# Patient Record
Sex: FEMALE | Race: WHITE | NOT HISPANIC OR LATINO | Employment: PART TIME | URBAN - METROPOLITAN AREA
[De-identification: names, ages, dates, MRNs, and addresses within clinical notes are randomized per-mention and may not be internally consistent; named-entity substitution may affect disease eponyms.]

---

## 2017-05-10 ENCOUNTER — NON-PROVIDER VISIT (OUTPATIENT)
Dept: OCCUPATIONAL MEDICINE | Facility: CLINIC | Age: 37
End: 2017-05-10

## 2017-05-10 DIAGNOSIS — Z11.1 ENCOUNTER FOR PPD TEST: ICD-10-CM

## 2017-05-10 PROCEDURE — 86580 TB INTRADERMAL TEST: CPT | Performed by: PREVENTIVE MEDICINE

## 2017-05-10 NOTE — MR AVS SNAPSHOT
Flor Álvarez   5/10/2017 10:30 AM   Non-Provider Visit   MRN: [id number]    Department:  [facility]   Dept Phone:  [phone]    Description:  Female : 1980   Provider:  BELLA MCGOVERN MA           Reason for Visit     PPD Placement           Allergies as of 5/10/2017     No Known Allergies      You were diagnosed with     Encounter for PPD test   [[id number]]         Vital Signs     Smoking Status                   Former Smoker           Basic Information     Date Of Birth Sex Race Ethnicity Preferred Language    1980 Female White Non- English      Problem List              ICD-10-CM Priority Class Noted - Resolved    Hyperthyroidism E05.90   2015 - Present      Health Maintenance        Date Due Completion Dates    IMM DTaP/Tdap/Td Vaccine (1 - Tdap) 2001 ---    PAP SMEAR 2003 ---            Current Immunizations     Tuberculin Skin Test 5/10/2017 10:43 AM, 2015 12:50 PM      Below and/or attached are the medications your provider expects you to take. Review all of your home medications and newly ordered medications with your provider and/or pharmacist. Follow medication instructions as directed by your provider and/or pharmacist. Please keep your medication list with you and share with your provider. Update the information when medications are discontinued, doses are changed, or new medications (including over-the-counter products) are added; and carry medication information at all times in the event of emergency situations     Allergies:  No Known Allergies          Medications  Valid as of: May 10, 2017 - 11:40 AM    Generic Name Brand Name Tablet Size Instructions for use    Amphetamine-Dextroamphetamine (Tab) ADDERALL 20 MG Take 20 mg by mouth 2 times a day.        MethIMAzole (Tab) TAPAZOLE 5 MG Take 1 Tab by mouth every 48 hours.        .                 Medicines prescribed today were sent to:     [id number] - [place], [place] - [address]    [address] [facility] [place] [address]    Phone: [phone] Fax: [phone]    Open 24 Hours?: Yes      Medication refill instructions:       If your prescription bottle indicates you have medication refills left, it is not necessary to call your provider’s office. Please contact your pharmacy and they will refill your medication.    If your prescription bottle indicates you do not have any refills left, you may request refills at any time through one of the following ways: The online [employer] system (except Urgent Care), by calling your provider’s office, or by asking your pharmacy to contact your provider’s office with a refill request. Medication refills are processed only during regular business hours and may not be available until the next business day. Your provider may request additional information or to have a follow-up visit with you prior to refilling your medication.   *Please Note: Medication refills are assigned a new Rx number when refilled electronically. Your pharmacy may indicate that no refills were authorized even though a new prescription for the same medication is available at the pharmacy. Please request the medicine by name with the pharmacy before contacting your provider for a refill.           [employer] Access Code: Activation code not generated  Current [employer] Status: Active

## 2017-05-12 ENCOUNTER — NON-PROVIDER VISIT (OUTPATIENT)
Dept: OCCUPATIONAL MEDICINE | Facility: CLINIC | Age: 37
End: 2017-05-12

## 2017-05-12 LAB — TB WHEAL 3D P 5 TU DIAM: NORMAL MM

## 2022-09-08 ENCOUNTER — APPOINTMENT (RX ONLY)
Dept: URBAN - METROPOLITAN AREA CLINIC 20 | Facility: CLINIC | Age: 42
Setting detail: DERMATOLOGY
End: 2022-09-08

## 2022-09-08 DIAGNOSIS — L81.5 LEUKODERMA, NOT ELSEWHERE CLASSIFIED: ICD-10-CM

## 2022-09-08 DIAGNOSIS — L57.8 OTHER SKIN CHANGES DUE TO CHRONIC EXPOSURE TO NONIONIZING RADIATION: ICD-10-CM

## 2022-09-08 DIAGNOSIS — D22 MELANOCYTIC NEVI: ICD-10-CM

## 2022-09-08 DIAGNOSIS — D18.0 HEMANGIOMA: ICD-10-CM

## 2022-09-08 DIAGNOSIS — L82.1 OTHER SEBORRHEIC KERATOSIS: ICD-10-CM

## 2022-09-08 DIAGNOSIS — L81.4 OTHER MELANIN HYPERPIGMENTATION: ICD-10-CM

## 2022-09-08 PROBLEM — D18.01 HEMANGIOMA OF SKIN AND SUBCUTANEOUS TISSUE: Status: ACTIVE | Noted: 2022-09-08

## 2022-09-08 PROBLEM — D22.5 MELANOCYTIC NEVI OF TRUNK: Status: ACTIVE | Noted: 2022-09-08

## 2022-09-08 PROBLEM — D22.4 MELANOCYTIC NEVI OF SCALP AND NECK: Status: ACTIVE | Noted: 2022-09-08

## 2022-09-08 PROBLEM — D22.61 MELANOCYTIC NEVI OF RIGHT UPPER LIMB, INCLUDING SHOULDER: Status: ACTIVE | Noted: 2022-09-08

## 2022-09-08 PROCEDURE — ? COUNSELING

## 2022-09-08 PROCEDURE — 99203 OFFICE O/P NEW LOW 30 MIN: CPT

## 2022-09-08 ASSESSMENT — LOCATION ZONE DERM
LOCATION ZONE: FACE
LOCATION ZONE: LEG
LOCATION ZONE: ARM
LOCATION ZONE: NECK
LOCATION ZONE: TRUNK
LOCATION ZONE: AXILLAE

## 2022-09-08 ASSESSMENT — LOCATION DETAILED DESCRIPTION DERM
LOCATION DETAILED: RIGHT INFERIOR MEDIAL UPPER BACK
LOCATION DETAILED: LEFT MEDIAL UPPER BACK
LOCATION DETAILED: RIGHT MID-UPPER BACK
LOCATION DETAILED: LEFT INFERIOR LATERAL NECK
LOCATION DETAILED: RIGHT CENTRAL MALAR CHEEK
LOCATION DETAILED: LEFT INFERIOR CENTRAL MALAR CHEEK
LOCATION DETAILED: RIGHT AXILLARY VAULT
LOCATION DETAILED: LEFT DISTAL DORSAL FOREARM
LOCATION DETAILED: LEFT ANTERIOR PROXIMAL UPPER ARM
LOCATION DETAILED: LEFT ANTERIOR DISTAL THIGH
LOCATION DETAILED: RIGHT DISTAL DORSAL FOREARM
LOCATION DETAILED: RIGHT SUPERIOR MEDIAL UPPER BACK
LOCATION DETAILED: RIGHT ANTERIOR PROXIMAL UPPER ARM
LOCATION DETAILED: RIGHT ANTERIOR DISTAL THIGH
LOCATION DETAILED: LOWER STERNUM
LOCATION DETAILED: LEFT MEDIAL SUPERIOR CHEST

## 2022-09-08 ASSESSMENT — LOCATION SIMPLE DESCRIPTION DERM
LOCATION SIMPLE: LEFT UPPER BACK
LOCATION SIMPLE: RIGHT THIGH
LOCATION SIMPLE: CHEST
LOCATION SIMPLE: RIGHT UPPER ARM
LOCATION SIMPLE: RIGHT AXILLARY VAULT
LOCATION SIMPLE: LEFT CHEEK
LOCATION SIMPLE: RIGHT UPPER BACK
LOCATION SIMPLE: LEFT FOREARM
LOCATION SIMPLE: RIGHT FOREARM
LOCATION SIMPLE: LEFT UPPER ARM
LOCATION SIMPLE: RIGHT CHEEK
LOCATION SIMPLE: LEFT ANTERIOR NECK
LOCATION SIMPLE: LEFT THIGH

## 2023-04-25 ENCOUNTER — NON-PROVIDER VISIT (OUTPATIENT)
Dept: URGENT CARE | Facility: CLINIC | Age: 43
End: 2023-04-25

## 2023-04-25 DIAGNOSIS — Z11.1 PPD SCREENING TEST: ICD-10-CM

## 2023-04-25 PROCEDURE — 86580 TB INTRADERMAL TEST: CPT

## 2023-04-25 PROCEDURE — 99999 PR NO CHARGE: CPT

## 2023-04-25 NOTE — NON-PROVIDER
Flor Álvarez is a 42 y.o. female here for a non-provider visit for PPD placement -- Step 1 of 1    Reason for PPD:  work requirement    1. TB evaluation questionnaire completed by patient? Yes      -  If any answers marked yes did you contact a provider prior to placing? Not Indicated  2.  Patient notified to return to clinic for reading on: Thursday, 4/27/23 after 1627 or Friday, 4/28/23 before 1627.  3.  PPD Placement documentation completed on TB evaluation questionnaire? Yes  4.  Location of TB evaluation questionnaire filed: .

## 2023-04-28 ENCOUNTER — NON-PROVIDER VISIT (OUTPATIENT)
Dept: URGENT CARE | Facility: CLINIC | Age: 43
End: 2023-04-28

## 2023-04-28 LAB — TB WHEAL 3D P 5 TU DIAM: NORMAL MM

## 2023-04-28 PROCEDURE — 99999 PR NO CHARGE: CPT | Performed by: NURSE PRACTITIONER

## 2024-02-07 ENCOUNTER — HOSPITAL ENCOUNTER (EMERGENCY)
Facility: MEDICAL CENTER | Age: 44
End: 2024-02-08
Attending: EMERGENCY MEDICINE
Payer: COMMERCIAL

## 2024-02-07 DIAGNOSIS — R19.7 DIARRHEA, UNSPECIFIED TYPE: ICD-10-CM

## 2024-02-07 DIAGNOSIS — R11.2 NAUSEA AND VOMITING, UNSPECIFIED VOMITING TYPE: ICD-10-CM

## 2024-02-07 DIAGNOSIS — E87.6 HYPOKALEMIA: Primary | ICD-10-CM

## 2024-02-07 DIAGNOSIS — E86.0 DEHYDRATION: ICD-10-CM

## 2024-02-07 PROCEDURE — 83690 ASSAY OF LIPASE: CPT

## 2024-02-07 PROCEDURE — 36415 COLL VENOUS BLD VENIPUNCTURE: CPT

## 2024-02-07 PROCEDURE — 99284 EMERGENCY DEPT VISIT MOD MDM: CPT

## 2024-02-07 PROCEDURE — 85025 COMPLETE CBC W/AUTO DIFF WBC: CPT

## 2024-02-07 PROCEDURE — 80053 COMPREHEN METABOLIC PANEL: CPT

## 2024-02-07 RX ORDER — SODIUM CHLORIDE 9 MG/ML
1000 INJECTION, SOLUTION INTRAVENOUS ONCE
Status: COMPLETED | OUTPATIENT
Start: 2024-02-08 | End: 2024-02-08

## 2024-02-07 RX ORDER — ONDANSETRON 2 MG/ML
4 INJECTION INTRAMUSCULAR; INTRAVENOUS ONCE
Status: COMPLETED | OUTPATIENT
Start: 2024-02-08 | End: 2024-02-08

## 2024-02-07 ASSESSMENT — PAIN DESCRIPTION - PAIN TYPE: TYPE: ACUTE PAIN

## 2024-02-08 VITALS
TEMPERATURE: 97.9 F | OXYGEN SATURATION: 94 % | RESPIRATION RATE: 16 BRPM | HEIGHT: 72 IN | DIASTOLIC BLOOD PRESSURE: 84 MMHG | WEIGHT: 171.96 LBS | SYSTOLIC BLOOD PRESSURE: 132 MMHG | BODY MASS INDEX: 23.29 KG/M2 | HEART RATE: 62 BPM

## 2024-02-08 LAB
ALBUMIN SERPL BCP-MCNC: 5.7 G/DL (ref 3.2–4.9)
ALBUMIN/GLOB SERPL: 1.5 G/DL
ALP SERPL-CCNC: 75 U/L (ref 30–99)
ALT SERPL-CCNC: 20 U/L (ref 2–50)
ANION GAP SERPL CALC-SCNC: 21 MMOL/L (ref 7–16)
APPEARANCE UR: ABNORMAL
AST SERPL-CCNC: 24 U/L (ref 12–45)
BACTERIA #/AREA URNS HPF: NEGATIVE /HPF
BASOPHILS # BLD AUTO: 0.4 % (ref 0–1.8)
BASOPHILS # BLD: 0.06 K/UL (ref 0–0.12)
BILIRUB SERPL-MCNC: 1.1 MG/DL (ref 0.1–1.5)
BILIRUB UR QL STRIP.AUTO: ABNORMAL
BUN SERPL-MCNC: 21 MG/DL (ref 8–22)
CALCIUM ALBUM COR SERPL-MCNC: 9 MG/DL (ref 8.5–10.5)
CALCIUM SERPL-MCNC: 10.4 MG/DL (ref 8.4–10.2)
CHLORIDE SERPL-SCNC: 96 MMOL/L (ref 96–112)
CO2 SERPL-SCNC: 20 MMOL/L (ref 20–33)
COLOR UR: YELLOW
CREAT SERPL-MCNC: 1.2 MG/DL (ref 0.5–1.4)
EOSINOPHIL # BLD AUTO: 0.17 K/UL (ref 0–0.51)
EOSINOPHIL NFR BLD: 1 % (ref 0–6.9)
EPI CELLS #/AREA URNS HPF: ABNORMAL /HPF
ERYTHROCYTE [DISTWIDTH] IN BLOOD BY AUTOMATED COUNT: 39.6 FL (ref 35.9–50)
GFR SERPLBLD CREATININE-BSD FMLA CKD-EPI: 58 ML/MIN/1.73 M 2
GLOBULIN SER CALC-MCNC: 3.9 G/DL (ref 1.9–3.5)
GLUCOSE SERPL-MCNC: 160 MG/DL (ref 65–99)
GLUCOSE UR STRIP.AUTO-MCNC: NEGATIVE MG/DL
HCT VFR BLD AUTO: 49.1 % (ref 37–47)
HGB BLD-MCNC: 16.7 G/DL (ref 12–16)
HYALINE CASTS #/AREA URNS LPF: ABNORMAL /LPF
IMM GRANULOCYTES # BLD AUTO: 0.06 K/UL (ref 0–0.11)
IMM GRANULOCYTES NFR BLD AUTO: 0.4 % (ref 0–0.9)
KETONES UR STRIP.AUTO-MCNC: 15 MG/DL
LEUKOCYTE ESTERASE UR QL STRIP.AUTO: NEGATIVE
LIPASE SERPL-CCNC: 36 U/L (ref 11–82)
LYMPHOCYTES # BLD AUTO: 0.52 K/UL (ref 1–4.8)
LYMPHOCYTES NFR BLD: 3 % (ref 22–41)
MCH RBC QN AUTO: 30.2 PG (ref 27–33)
MCHC RBC AUTO-ENTMCNC: 34 G/DL (ref 32.2–35.5)
MCV RBC AUTO: 88.8 FL (ref 81.4–97.8)
MICRO URNS: ABNORMAL
MONOCYTES # BLD AUTO: 0.69 K/UL (ref 0–0.85)
MONOCYTES NFR BLD AUTO: 4 % (ref 0–13.4)
NEUTROPHILS # BLD AUTO: 15.64 K/UL (ref 1.82–7.42)
NEUTROPHILS NFR BLD: 91.2 % (ref 44–72)
NITRITE UR QL STRIP.AUTO: NEGATIVE
NRBC # BLD AUTO: 0 K/UL
NRBC BLD-RTO: 0 /100 WBC (ref 0–0.2)
PH UR STRIP.AUTO: 5.5 [PH] (ref 5–8)
PLATELET # BLD AUTO: 361 K/UL (ref 164–446)
PMV BLD AUTO: 9.7 FL (ref 9–12.9)
POTASSIUM SERPL-SCNC: 3.1 MMOL/L (ref 3.6–5.5)
PROT SERPL-MCNC: 9.6 G/DL (ref 6–8.2)
PROT UR QL STRIP: 30 MG/DL
RBC # BLD AUTO: 5.53 M/UL (ref 4.2–5.4)
RBC # URNS HPF: ABNORMAL /HPF
RBC UR QL AUTO: ABNORMAL
SODIUM SERPL-SCNC: 137 MMOL/L (ref 135–145)
SP GR UR STRIP.AUTO: >=1.03
WBC # BLD AUTO: 17.1 K/UL (ref 4.8–10.8)
WBC #/AREA URNS HPF: ABNORMAL /HPF

## 2024-02-08 PROCEDURE — 81001 URINALYSIS AUTO W/SCOPE: CPT

## 2024-02-08 PROCEDURE — 96375 TX/PRO/DX INJ NEW DRUG ADDON: CPT

## 2024-02-08 PROCEDURE — 700111 HCHG RX REV CODE 636 W/ 250 OVERRIDE (IP): Performed by: EMERGENCY MEDICINE

## 2024-02-08 PROCEDURE — 96365 THER/PROPH/DIAG IV INF INIT: CPT

## 2024-02-08 PROCEDURE — 700105 HCHG RX REV CODE 258: Performed by: EMERGENCY MEDICINE

## 2024-02-08 PROCEDURE — 700102 HCHG RX REV CODE 250 W/ 637 OVERRIDE(OP): Mod: JZ | Performed by: EMERGENCY MEDICINE

## 2024-02-08 PROCEDURE — A9270 NON-COVERED ITEM OR SERVICE: HCPCS | Mod: JZ | Performed by: EMERGENCY MEDICINE

## 2024-02-08 PROCEDURE — 96367 TX/PROPH/DG ADDL SEQ IV INF: CPT

## 2024-02-08 RX ORDER — ONDANSETRON 4 MG/1
4 TABLET, ORALLY DISINTEGRATING ORAL EVERY 6 HOURS PRN
Qty: 16 TABLET | Refills: 0 | Status: SHIPPED | OUTPATIENT
Start: 2024-02-08 | End: 2024-02-12

## 2024-02-08 RX ORDER — POTASSIUM CHLORIDE 7.45 MG/ML
10 INJECTION INTRAVENOUS ONCE
Status: COMPLETED | OUTPATIENT
Start: 2024-02-08 | End: 2024-02-08

## 2024-02-08 RX ORDER — POTASSIUM CHLORIDE 20 MEQ/1
40 TABLET, EXTENDED RELEASE ORAL ONCE
Status: COMPLETED | OUTPATIENT
Start: 2024-02-08 | End: 2024-02-08

## 2024-02-08 RX ORDER — MAGNESIUM SULFATE 1 G/100ML
1 INJECTION INTRAVENOUS ONCE
Status: COMPLETED | OUTPATIENT
Start: 2024-02-08 | End: 2024-02-08

## 2024-02-08 RX ADMIN — MAGNESIUM SULFATE IN DEXTROSE 1 G: 10 INJECTION, SOLUTION INTRAVENOUS at 01:43

## 2024-02-08 RX ADMIN — POTASSIUM CHLORIDE 40 MEQ: 1500 TABLET, EXTENDED RELEASE ORAL at 01:36

## 2024-02-08 RX ADMIN — SODIUM CHLORIDE 1000 ML: 9 INJECTION, SOLUTION INTRAVENOUS at 00:00

## 2024-02-08 RX ADMIN — ONDANSETRON 4 MG: 2 INJECTION INTRAMUSCULAR; INTRAVENOUS at 00:00

## 2024-02-08 RX ADMIN — POTASSIUM CHLORIDE 10 MEQ: 7.46 INJECTION, SOLUTION INTRAVENOUS at 01:40

## 2024-02-08 NOTE — ED PROVIDER NOTES
"  ER Provider Note    Scribed for Grey Jacques Ii, M.d. by Gonzalo Sprague. 2/7/2024  11:26 PM    Primary Care Provider: Lawrence Jimenez M.D.    CHIEF COMPLAINT  Chief Complaint   Patient presents with    N/V     42 YO female ambulates to room 4 with CC of N/V/D that started around 1700 tonight. Denies anyone being sick in the house, denies eating anything that could have made them sick.   Vomiting x12    Diarrhea     Reports too many times to count         HPI/ROS  LIMITATION TO HISTORY   Select: : None  OUTSIDE HISTORIAN(S):  None    Flor Álvarez is a 43 y.o. female who presents to the ED complaining of flu like symptoms onset 6.5 hours ago. The patient reports associated muscle cramps, nausea, multiple episodes of vomiting, and diarrhea. She states she presented to the ED tonight because her family told her she looked blue when they came to  her daughter and dog. The patient denies any history of medical problems.     PAST MEDICAL HISTORY  Past Medical History:   Diagnosis Date    Thyroid disease        SURGICAL HISTORY  Past Surgical History:   Procedure Laterality Date    TONSILLECTOMY         FAMILY HISTORY  Family History   Problem Relation Age of Onset    Cancer Father        SOCIAL HISTORY   reports that she has quit smoking. There was no smokeless tobacco history noted. She reports current alcohol use.    CURRENT MEDICATIONS  Previous Medications    AMPHETAMINE-DEXTROAMPHETAMINE (ADDERALL, 20MG,) 20 MG TAB    Take 20 mg by mouth 2 times a day.    METHIMAZOLE (TAPAZOLE) 5 MG TABS    Take 1 Tab by mouth every 48 hours.       ALLERGIES  Patient has no known allergies.    PHYSICAL EXAM  /78   Pulse (!) 105   Temp 36.4 °C (97.6 °F) (Temporal)   Resp 18   Ht 1.854 m (6' 1\")   Wt 78 kg (171 lb 15.3 oz)   LMP 02/07/2024 (Exact Date)   SpO2 99%   BMI 22.69 kg/m²   Physical Exam  Vitals and nursing note reviewed.   Constitutional:       Appearance: Normal appearance.   HENT: "      Head: Normocephalic and atraumatic.      Mouth/Throat:      Mouth: Mucous membranes are dry.   Eyes:      Extraocular Movements: Extraocular movements intact.      Pupils: Pupils are equal, round, and reactive to light.   Cardiovascular:      Rate and Rhythm: Regular rhythm. Tachycardia present.   Pulmonary:      Effort: Pulmonary effort is normal.      Breath sounds: Normal breath sounds.   Abdominal:      General: There is no distension.      Tenderness: There is no abdominal tenderness. There is no guarding.   Musculoskeletal:      Cervical back: Normal range of motion.   Neurological:      General: No focal deficit present.      Mental Status: She is alert.   Psychiatric:         Mood and Affect: Mood normal.          DIAGNOSTIC STUDIES    Labs:   Results for orders placed or performed during the hospital encounter of 02/07/24   CBC WITH DIFFERENTIAL   Result Value Ref Range    WBC 17.1 (H) 4.8 - 10.8 K/uL    RBC 5.53 (H) 4.20 - 5.40 M/uL    Hemoglobin 16.7 (H) 12.0 - 16.0 g/dL    Hematocrit 49.1 (H) 37.0 - 47.0 %    MCV 88.8 81.4 - 97.8 fL    MCH 30.2 27.0 - 33.0 pg    MCHC 34.0 32.2 - 35.5 g/dL    RDW 39.6 35.9 - 50.0 fL    Platelet Count 361 164 - 446 K/uL    MPV 9.7 9.0 - 12.9 fL    Neutrophils-Polys 91.20 (H) 44.00 - 72.00 %    Lymphocytes 3.00 (L) 22.00 - 41.00 %    Monocytes 4.00 0.00 - 13.40 %    Eosinophils 1.00 0.00 - 6.90 %    Basophils 0.40 0.00 - 1.80 %    Immature Granulocytes 0.40 0.00 - 0.90 %    Nucleated RBC 0.00 0.00 - 0.20 /100 WBC    Neutrophils (Absolute) 15.64 (H) 1.82 - 7.42 K/uL    Lymphs (Absolute) 0.52 (L) 1.00 - 4.80 K/uL    Monos (Absolute) 0.69 0.00 - 0.85 K/uL    Eos (Absolute) 0.17 0.00 - 0.51 K/uL    Baso (Absolute) 0.06 0.00 - 0.12 K/uL    Immature Granulocytes (abs) 0.06 0.00 - 0.11 K/uL    NRBC (Absolute) 0.00 K/uL   COMP METABOLIC PANEL   Result Value Ref Range    Sodium 137 135 - 145 mmol/L    Potassium 3.1 (L) 3.6 - 5.5 mmol/L    Chloride 96 96 - 112 mmol/L    Co2 20  20 - 33 mmol/L    Anion Gap 21.0 (H) 7.0 - 16.0    Glucose 160 (H) 65 - 99 mg/dL    Bun 21 8 - 22 mg/dL    Creatinine 1.20 0.50 - 1.40 mg/dL    Calcium 10.4 (H) 8.4 - 10.2 mg/dL    Correct Calcium 9.0 8.5 - 10.5 mg/dL    AST(SGOT) 24 12 - 45 U/L    ALT(SGPT) 20 2 - 50 U/L    Alkaline Phosphatase 75 30 - 99 U/L    Total Bilirubin 1.1 0.1 - 1.5 mg/dL    Albumin 5.7 (H) 3.2 - 4.9 g/dL    Total Protein 9.6 (H) 6.0 - 8.2 g/dL    Globulin 3.9 (H) 1.9 - 3.5 g/dL    A-G Ratio 1.5 g/dL   LIPASE   Result Value Ref Range    Lipase 36 11 - 82 U/L   URINALYSIS (UA)    Specimen: Urine, Clean Catch   Result Value Ref Range    Color Yellow     Character Hazy (A)     Specific Gravity >=1.030 <1.035    Ph 5.5 5.0 - 8.0    Glucose Negative Negative mg/dL    Ketones 15 (A) Negative mg/dL    Protein 30 (A) Negative mg/dL    Bilirubin Small (A) Negative    Nitrite Negative Negative    Leukocyte Esterase Negative Negative    Occult Blood Large (A) Negative    Micro Urine Req Microscopic    ESTIMATED GFR   Result Value Ref Range    GFR (CKD-EPI) 58 (A) >60 mL/min/1.73 m 2   URINE MICROSCOPIC (W/UA)   Result Value Ref Range    WBC 0-2 /hpf    RBC 5-10 (A) /hpf    Bacteria Negative None /hpf    Epithelial Cells Few Few /hpf    Hyaline Cast 6-10 (A) /lpf     COURSE & MEDICAL DECISION MAKING     ED Observation Status? No; Patient does not meet criteria for ED Observation.     INITIAL ASSESSMENT, COURSE AND PLAN  Care Narrative: This is an emergency department evaluation of a 43 year old woman previously healthy who approximately 6 hours ago started to have frequent nausea, vomiting, and diarrhea. No fever. No abdominal pain. On exam she appears dehydrated with tachycardia and dry mucus membranes. Likely gastroenteritis. Could also be food born illness. Labs to check blood counts and electrolyte levels. Will give fluids for dehydration and zofran for vomiting.     11:31 PM   Patient seen and examined at bedside. Discussed plan of care,  including getting blood work to evaluate her electrolytes and giving her fluids and nausea medications. Patient agrees to the plan of care. The patient will be resuscitated with 1L NS IV and medicated with Zofran 4 mg injection for her nausea. Ordered for CBC w/ diff, CMP, lipase, and UA to evaluate her symptoms.      11:59 PM  The patient will be treated with fentanyl 50 mcg injection for her muscle aches.    12:52 AM  I reevaluated the patient at bedside. She informed me she feels improved following fentanyl administration and would like to drink some water. The patient denies any current abdominal pain. I informed her her white count is elevated and this is likely due to an infection and not appendicitis, so she will not need a CT scan. Her potassium is 3.1, so I will give her a potassium replacement with IV and PO. Patient verbalizes understanding and agreement to this plan of care. The patient will be treated with magnesium sulfate in DSW 1 g IV, potassium chloride 40 mEq PO, and potassium chloride 10 mEq IV for her symptoms.    2:20 AM   Nursing staff informed me the patient is sleeping comfortably in bed and is feeling improved following PO challenge.     3:05 AM   I reevaluated the patient at bedside. I informed the patient her UA showed no bacteria. There is blood in urine and she says she is having her period now. The patient is tolerating oral intake well. She is not having any abdominal pain.  I discussed plan for discharge and follow up as outlined below. The patient is stable for discharge at this time and will return for any new or worsening symptoms. Patient verbalizes understanding and support with my plan for discharge.      HYDRATION: Based on the patient's presentation of Abnormal Fluid Loss, Acute Vomiting, Dehydration, and Tachycardia the patient was given IV fluids. IV Hydration was used because oral hydration was not adequate alone. Upon recheck following hydration, the patient was improved  with decreased heart rate..        PROBLEM LIST  #Vomiting and diarrhea   -viral vs food borne illness.     -improved dramatically with treatment here      DISPOSITION AND DISCUSSIONS  I have discussed management of the patient with the following physicians and JEREMÍAS's:  None    Discussion of management with other [facility] or appropriate source(s): None     Barriers to care at this time, including but not limited to:  None .       The patient will return for new or worsening symptoms and is stable at the time of discharge.    DISPOSITION:  Patient will be discharged home in stable condition.    FOLLOW UP:  [facility] Emergency Dept  [address]  [place] [id number] [phone]    Abdominal pain that is not going away, unable to stop vomiting    Lawrence Jimenez M.D.  [address]  [address]  [place] [address]  [phone]    Go to   minor concerns and routine evaluations    FINAL DIAGNOSIS  1. Hypokalemia    2. Nausea and vomiting, unspecified vomiting type    3. Diarrhea, unspecified type    4. Dehydration       Gonzalo JAIN (Ronak), am scribing for, and in the presence of, SHERINE Cruz II.    Electronically signed by: Gonzalo Sprague (Ronak), 2/7/2024    IGrey II, M* personally performed the services described in this documentation, as scribed by Gonzalo Sprague in my presence, and it is both accurate and complete.      The note accurately reflects work and decisions made by me.  Grey Jacques II, M.D.  2/8/2024  6:14 AM

## 2024-02-08 NOTE — ED NOTES
Pt discharged home with instructions to follow up with primary care as needed.  Pt educated on new prescription medications and where to pick them up. The pt denies questions at this time.  Pt instructed to come back to the ER if symptoms worsen or they feel they are having a medical emergency.  Pt is alert and oriented, speaking in full sentences. Pt ambulates to the waiting area without incident.

## 2024-02-08 NOTE — ED TRIAGE NOTES
"Chief Complaint   Patient presents with    N/V     42 YO female ambulates to room 4 with CC of N/V/D that started around 1700 tonight. Denies anyone being sick in the house, denies eating anything that could have made them sick.   Vomiting x12    Diarrhea     Reports too many times to count   /78   Pulse (!) 105   Temp 36.4 °C (97.6 °F) (Temporal)   Resp 18   Ht 1.854 m (6' 1\")   Wt 78 kg (171 lb 15.3 oz)   LMP 02/07/2024 (Exact Date)   SpO2 99%   BMI 22.69 kg/m²   "

## 2024-02-08 NOTE — ED NOTES
Pt rounded on, appears to be sleeping, NAD noted. ERP consulted on POC. Will attempt to collect urine sample when IV medications finish.

## 2025-05-29 ENCOUNTER — NON-PROVIDER VISIT (OUTPATIENT)
Dept: URGENT CARE | Facility: CLINIC | Age: 45
End: 2025-05-29
Payer: COMMERCIAL

## 2025-05-29 DIAGNOSIS — Z11.1 PPD SCREENING TEST: Primary | ICD-10-CM

## 2025-05-29 NOTE — NON-PROVIDER
Flor Álvarez is a 44 y.o. female here for a non-provider visit for PPD placement -- Step 1 of 1    Reason for PPD:  work requirement    1. TB evaluation questionnaire completed by patient? Yes      -  If any answers marked yes did you contact a provider prior to placing? Yes  2.  Patient notified to return to clinic for reading on: Between 5/31/25 3:47pm and 6/1/25 3:47pm  3.  PPD Placement documentation completed on TB evaluation questionnaire? YES  4.  Location of TB evaluation questionnaire filed: [facility]

## 2025-06-01 ENCOUNTER — NON-PROVIDER VISIT (OUTPATIENT)
Dept: URGENT CARE | Facility: CLINIC | Age: 45
End: 2025-06-01

## 2025-06-01 LAB — TB WHEAL 3D P 5 TU DIAM: NORMAL MM

## 2025-06-01 PROCEDURE — 99999 PR NO CHARGE: CPT

## 2025-06-01 NOTE — PROGRESS NOTES
CARDIOLOGY RAPID ACCESS CLINIC CONSULT NOTE     Assessment/Plan:   1.  New onset atrial fibrillation.  Heart rate control is reasonable today, but with some decrease in her activity tolerance will try metoprolol 25 mg daily.  This could be discontinued if it does not improve her activity tolerance.  We discussed that with her [id number] score of at least 3 long-term anticoagulation is suggested in the absence of contraindications.  She is agreeable with this approach.  2.  Acute pulmonary embolism, uncertain etiology, these appear unprovoked.  Also warrants long-term anticoagulation.  3.  Sedentary lifestyle.  Advocated 150 minutes of moderate aerobic activities each week.    Follow-up as needed or for progressive symptoms     History of Present Illness:     It is my pleasure to see Yusra Rangel at the [facility] RAPID ACCESS clinic for evaluation of atrial fibrillation.  She is accompanied by her  today.    Yusra Rangel is a 77 year old female with a benign past medical history, who seldom sees physicians.  She had cataract surgery done earlier this year, last ECG was 12/2021.  6/17/2024 she had done some lifting of heavy bottles and had some sharp left-sided chest pain that was pleuritic in nature.  She also noticed that she was more short of breath.  Sometimes this chest pain would radiate in the left side of her neck.  For evaluation she presented to the emergency room for this discomfort and was found to have bilateral pulmonary emboli along with atrial fibrillation.  Fibrillation rate was reasonably controlled.  There was no obvious trigger for her pulmonary embolism, and long-term anticoagulation was recommended.  She was started on Eliquis 5 mg daily and within a couple of days her chest pains have resolved and she has felt well since that time.  She still has no awareness of atrial fibrillation though she does feel that perhaps she is more short of breath carrying things  Flor Álvarez is a 44 y.o. female here for a non-provider visit for PPD reading -- Step 1 of 2.      1.  Resulted in Epic under enter/edit results? No   2.  TB evaluation questionnaire scanned into chart and original given to patient?Yes      3. Was induration greater than 0 mm? No.    If Step 1 of 2, when is patient returning for second step (delete if N/A): N/A    Routed to PCP? No      than she was a year ago.  She does not engage in much regular exercise.  She has had no syncope or near syncopal spells.  She has no orthopnea or paroxysmal nocturnal dyspnea.  She does snore though her sleep is generally restorative.  She has had no bruising or bleeding problems since starting the Eliquis, and offers no complaints today.    Alcohol intake is rare.  Weight is stable.  Echocardiogram showed normal LV function, moderate pulmonary hypertension was suggested in the setting of the pulmonary emboli.  She has been monitoring her blood pressure.  Systolics are generally in the 130s to 140s, heart rates in the 80s to 90s.  She recalls years ago when she would donate blood her heart rate was usually consistently in the 50s to 60s.    Past Medical History:     Patient Active Problem List   Diagnosis    Transient global amnesia    Leukocytosis    History of hyperglycemia    New onset atrial fibrillation (H)    Multiple subsegmental pulmonary emboli without acute cor pulmonale (H)       Past Surgical History:     Past Surgical History:   Procedure Laterality Date    VAGINAL DELIVERY N/A     Vaginal delivery x 4    WRIST SURGERY         Family History:     Family History   Problem Relation Age of Onset    Rectal Cancer Mother          age 82    Lung Cancer Father          age 69    Heart Disease Brother     Heart Disease Brother      Family history reviewed and is not pertinent to the chief complaint or presenting problem    Social History:    reports that she has never smoked. She has never used smokeless tobacco. She reports current alcohol use.    Exercise: Minimal, housework, cares for her 6-year-old grandchild.  Occasionally carries groceries in with no difficulties, though possibly more short of breath over the last couple of weeks than what she was before.    Sleep: Generally restorative.  Does snore.  No apnea noted.  No daytime sleepiness.    Meds:     Current Outpatient Medications  "  Medication Sig Dispense Refill    apixaban ANTICOAGULANT (ELIQUIS) 5 MG tablet Take 2 tablets (10 mg) by mouth 2 times daily for 6 days, THEN 1 tablet (5 mg) 2 times daily for 360 days. 744 tablet 0       Allergies:   Patient has no known allergies.    Review of Systems:     Positive for chest pain, shortness of breath with activity, irregular heartbeat.  12 point review of systems otherwise negative     Please refer above for cardiac ROS details.       Objective:      Physical Exam  97.5 kg (215 lb)  1.702 m (5' 7\")  Body mass index is 33.67 kg/m .  /68 (BP Location: Left arm, Patient Position: Sitting, Cuff Size: Adult Large)   Pulse 90   Resp 22   Ht 1.702 m (5' 7\")   Wt 97.5 kg (215 lb)   BMI 33.67 kg/m    Resting heart rate 88 bpm.  Increases to 116 bpm after climbing 10 steps.  No shortness of breath.      General Appearance : Awake, Alert, No acute distress  HEENT: No Scleral icterus; the mucous membranes were pink and moist.  Conjunctivae not injected  Neck:  No cervical bruits, jugular venous distention, or thyromegaly   Chest: The spine was straight. Chest wall symmetric  Lungs: Respirations unlabored; the lungs are clear to auscultation.  No wheezing   Cardiovascular:   Normal point of maximal impulse.  Auscultation reveals irregularly irregular first and second heart sounds with no murmurs, rubs, or gallops.  Carotid, radial, and dorsalis pedal pulses are intact and symmetric.    Abdomen: Rounded.  No organomegaly, masses, bruits, or tenderness. Bowels sounds are present  Extremities: No edema  Skin: No xanthelasma. Warm, Dry.  Musculoskeletal: No tenderness.  Neurologic: Alert and oriented ×3. Speech is fluent.      EKG (personally reviewed):  6/17/2024: Atrial fibrillation 77 bpm.  ST and T wave changes consistent with lateral ischemia new versus 12/26/2021    Cardiac Imaging Studies:  Echocardiogram 6/17/2024:  1. Left ventricular chamber size, wall thickness and systolic function " are  normal. The visually estimated left ventricular ejection fraction is 65-70%.  2. Right ventricular chamber size and systolic function are normal.  3. Mild left atrial enlargement.  4. No hemodynamically significant valvular abnormalities.  5. Moderate pulmonary hypertension is present with an estimated pulmonary  artery systolic pressure of 51 mmHg.  6. No prior study available for comparison.    CT chest PE run 6/17/2024:CORONARY ARTERY CALCIFICATION: No significant.   1.  Small pulmonary artery embolus burden to segmental and subsegmental branches of the left lower lobe as well as subsegmental branches of the right lower lobe. No right heart strain.   2.  Scattered areas of groundglass / nodular opacity and interlobular septal thickening, along with small left and trace right pleural effusions, findings suggestive of pulmonary edema.     Lab Results    Chemistry/lipid CBC Cardiac Enzymes/BNP/TSH/INR   Recent Labs   Lab Test 12/18/23  1050   CHOL 227*   HDL 52   *   TRIG 124     Recent Labs   Lab Test 12/18/23  1050   *     Recent Labs   Lab Test 06/17/24  0531      POTASSIUM 4.5   CHLORIDE 106   CO2 23   *   BUN 21.7   CR 0.87   GFRESTIMATED 69   WILD 9.0     Recent Labs   Lab Test 06/17/24  0531 12/18/23  1050 12/27/21  0732   CR 0.87 0.80 0.73     Recent Labs   Lab Test 12/18/23  1050   A1C 5.9*          Recent Labs   Lab Test 06/17/24  0531   WBC 10.7   HGB 13.5   HCT 40.9   MCV 89        Recent Labs   Lab Test 06/17/24  0531 12/18/23  1050 12/27/21  0732   HGB 13.5 13.6 13.1    Recent Labs   Lab Test 12/26/21  0656   TROPONINI <0.01     Recent Labs   Lab Test 06/17/24  0531   NTBNPI 879     Recent Labs   Lab Test 06/17/24  0750   TSH 3.38     Recent Labs   Lab Test 12/26/21  0715   INR 1.10          Andre Gould MD, MD [facility]      This note created using Dragon voice recognition software. Sound alike errors may have escaped editing.